# Patient Record
Sex: MALE | Race: WHITE | NOT HISPANIC OR LATINO | Employment: UNEMPLOYED | URBAN - METROPOLITAN AREA
[De-identification: names, ages, dates, MRNs, and addresses within clinical notes are randomized per-mention and may not be internally consistent; named-entity substitution may affect disease eponyms.]

---

## 2017-04-03 ENCOUNTER — HOSPITAL ENCOUNTER (OUTPATIENT)
Dept: ORTHOPEDIC SURGERY | Facility: CLINIC | Age: 32
Discharge: HOME | End: 2017-04-03
Attending: ORTHOPAEDIC SURGERY | Admitting: ORTHOPAEDIC SURGERY

## 2021-03-01 ENCOUNTER — TRANSCRIBE ORDERS (OUTPATIENT)
Dept: PHYSICAL THERAPY | Facility: CLINIC | Age: 36
End: 2021-03-01

## 2021-03-01 DIAGNOSIS — M54.10 RADICULAR LOW BACK PAIN: Primary | ICD-10-CM

## 2021-03-02 ENCOUNTER — TREATMENT (OUTPATIENT)
Dept: PHYSICAL THERAPY | Facility: CLINIC | Age: 36
End: 2021-03-02

## 2021-03-02 DIAGNOSIS — G89.29 CHRONIC BILATERAL LOW BACK PAIN, UNSPECIFIED WHETHER SCIATICA PRESENT: ICD-10-CM

## 2021-03-02 DIAGNOSIS — M54.50 CHRONIC BILATERAL LOW BACK PAIN, UNSPECIFIED WHETHER SCIATICA PRESENT: ICD-10-CM

## 2021-03-02 DIAGNOSIS — R29.3 ABNORMAL POSTURE: ICD-10-CM

## 2021-03-02 DIAGNOSIS — M54.10 RADICULAR LOW BACK PAIN: Primary | ICD-10-CM

## 2021-03-02 PROCEDURE — 97014 ELECTRIC STIMULATION THERAPY: CPT | Performed by: PHYSICAL THERAPIST

## 2021-03-02 PROCEDURE — 97162 PT EVAL MOD COMPLEX 30 MIN: CPT | Performed by: PHYSICAL THERAPIST

## 2021-03-02 PROCEDURE — 97140 MANUAL THERAPY 1/> REGIONS: CPT | Performed by: PHYSICAL THERAPIST

## 2021-03-02 NOTE — PROGRESS NOTES
Physical Therapy Initial Evaluation and Plan of Care    Patient: Pawel Burch   : 1985  Diagnosis/ICD-10 Code:  Radicular low back pain [M54.10]  Referring practitioner: Zackary Mcgrath DO  Date of Initial Visit: 3/2/2021  Today's Date: 3/2/2021  Patient seen for 1 sessions           Subjective Questionnaire: Oswestry: 39      Subjective Evaluation    History of Present Illness  Mechanism of injury: Patient reports that he started having back and leg pain in  but it has been off and on since. In 2020, he slipped off of a curb and vilma his right leg. Since then, he has had a numbing/burning pain in the back and into the leg; the bottom of the right foot is numb. Between  and 2020 he has injured his back and leg about 6 times.    Pain is located in the lumbar spine, buttock crease, above and below and knee, into the calf and down into the foot on the right.    Sleeping throughout the night, walking, standing, bending forward, going up and down stairs, and squatting tends to aggravate symptoms into the lower back and leg. Heat can help a little but nothing else really helps ease pain.    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 Patient took time off away from his job at this time due to pain.      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 back pain Quality of life: good    Pain  Current pain ratin  At best pain ratin  At worst pain rating: 10  Quality: burning, dull ache, radiating, tight, squeezing, pressure and cramping  Aggravating factors: ambulation, squatting, lifting, stairs, prolonged positioning, movement, standing and sleeping  Progression: worsening    Social Support  Lives in: apartment    Patient Goals  Patient goals for therapy: decreased pain, improved balance, increased motion, increased strength, independence with ADLs/IADLs and return to sport/leisure activities              Objective        Special Questions  Patient is experiencing night pain and disturbed sleep.       Postural Observations  Seated posture: fair        Palpation   Left   Hypertonic in the erector spinae and lumbar paraspinals.     Right   Hypertonic in the erector spinae, gluteus medius, lumbar paraspinals and piriformis. Tenderness of the erector spinae, gluteus medius, iliopsoas, lumbar paraspinals and piriformis.     Tenderness     Right Hip   Tenderness in the PSIS and ischial tuberosity.     Neurological Testing     Sensation     Lumbar   Left   Intact: light touch    Right   Intact: light touch    Active Range of Motion     Additional Active Range of Motion Details  Flexion 75% limited; extension WNL; L rotation WNL; R rotation limited 50%    Joint Play   Left Hip   Hypomobile in the posterior hip capsule and long axis distraction.    Right Hip     Hypomobile in the posterior hip capsule and long axis distraction    Strength/Myotome Testing     Lumbar   Left   Normal strength    Right Hip   Planes of Motion   Flexion: 3+    Right Knee   Flexion: 4+  Extension: 4+    Right Ankle/Foot   Dorsiflexion: 5    Tests       Thoracic   Positive slump.     Left Pelvic Girdle/Sacrum   Negative: sacrum compression and gapping.     Right Pelvic Girdle/Sacrum   Negative: sacrum compression and gapping.     Left Hip   Negative SOLOMON and FADIR.     Right Hip   Negative SOLOMON and FADIR.     Ambulation     Quality of Movement During Gait   Trunk    Trunk (Right): Positive lateral lean over stance limb.     Hip    Hip (Right): Positive circumducted.     Knee    Knee (Right): Positive stiff knee.           Assessment & Plan     Assessment  Impairments: abnormal coordination, abnormal muscle firing, abnormal muscle tone, abnormal or restricted ROM, activity intolerance, impaired physical strength, lacks appropriate home exercise program and pain with function  Assessment details: The patient is a 35 y.o. male who presents to  physical therapy today with orders to address low back pain. Upon initial evaluation, the patient demonstrates the following impairments: increased pain, increased muscle tone, abnormal posture, decreased strength, decreased joint mobility, and decreased ROM. Due to these impairments, the patient is unable to sleep throughout the night, sit for long periods of time, stand for long periods of time, and perform work related duties without an increase in symptoms. The patient would benefit from skilled PT services to address functional limitations and impairments and to improve patient quality of life.      Prognosis: good  Functional Limitations: carrying objects, lifting, sleeping, walking, pushing, uncomfortable because of pain, moving in bed, sitting and standing  Goals  Plan Goals: STG's: 2 weeks  Patient will report a decrease in pain by 25%   Patient will be able to pick a light (<3#) object off the floor without an increase in pain  Patient will be able to perform HEP with minimal verbal cues    LTG's: By discharge  Patient will report a decrease in pain by 90%  Patient will report an elimination of radicular symptoms  Patient will demonstrate an improvement in overall function as measured by an improvement in the Oswestry Disability Index score by >/= 10 points   Patient will be able to sleep > 5 hours without waking from pain  Patient will be able to tolerate standing for > 60 minutes without increased pain  Patient will be able to tolerate sitting for > 60 minutes without increased pain  Patient will be able to ambulate community distances without increased pain  Patient will be independent with final HEP       Plan  Therapy options: will be seen for skilled physical therapy services  Planned modality interventions: cryotherapy, electrical stimulation/Russian stimulation, TENS, thermotherapy (hydrocollator packs), dry needling and traction  Planned therapy interventions: abdominal trunk stabilization, ADL  retraining, balance/weight-bearing training, flexibility, functional ROM exercises, home exercise program, IADL retraining, joint mobilization, manual therapy, neuromuscular re-education, postural training, soft tissue mobilization, spinal/joint mobilization, strengthening, stretching and therapeutic activities  Duration in visits: 14  Treatment plan discussed with: patient        History # of Personal Factors and/or Comorbidities: MODERATE (1-2)  Examination of Body System(s): # of elements: MODERATE (3)  Clinical Presentation: EVOLVING  Clinical Decision Making: MODERATE      Timed:         Manual Therapy:    8     mins  44397;     Therapeutic Exercise:    5     mins  87299;     Neuromuscular Leeanna:        mins  30170;    Therapeutic Activity:          mins  19252;     Gait Training:           mins  24461;     Ultrasound:          mins  29052;    Ionto                                   mins   22974  Self Care                            mins   15965  Canalith Repos         mins 06481      Un-Timed:  Electrical Stimulation:    15     mins  76298 ( );  Dry Needling          mins self-pay  Traction          mins 42922  Low Eval          Mins  36665  Mod Eval     27     Mins  22282  High Eval                            Mins  84915  Re-Eval                               mins  83935        Timed Treatment:   13   mins   Total Treatment:     55   mins    PT SIGNATURE: Sammi Levine PT   DATE TREATMENT INITIATED: 3/2/2021    Initial Certification  Certification Period: 5/31/2021  I certify that the therapy services are furnished while this patient is under my care.  The services outlined above are required by this patient, and will be reviewed every 90 days.     PHYSICIAN: Zackary Mcgrath,       DATE:     Please sign and return via fax to [phone].. Thank you, [facility].

## 2021-03-10 ENCOUNTER — TREATMENT (OUTPATIENT)
Dept: PHYSICAL THERAPY | Facility: CLINIC | Age: 36
End: 2021-03-10

## 2021-03-10 DIAGNOSIS — G89.29 CHRONIC BILATERAL LOW BACK PAIN, UNSPECIFIED WHETHER SCIATICA PRESENT: ICD-10-CM

## 2021-03-10 DIAGNOSIS — R29.3 ABNORMAL POSTURE: ICD-10-CM

## 2021-03-10 DIAGNOSIS — M54.10 RADICULAR LOW BACK PAIN: Primary | ICD-10-CM

## 2021-03-10 DIAGNOSIS — M54.50 CHRONIC BILATERAL LOW BACK PAIN, UNSPECIFIED WHETHER SCIATICA PRESENT: ICD-10-CM

## 2021-03-10 PROCEDURE — 97140 MANUAL THERAPY 1/> REGIONS: CPT | Performed by: PHYSICAL THERAPIST

## 2021-03-10 PROCEDURE — 97110 THERAPEUTIC EXERCISES: CPT | Performed by: PHYSICAL THERAPIST

## 2021-03-10 PROCEDURE — 97112 NEUROMUSCULAR REEDUCATION: CPT | Performed by: PHYSICAL THERAPIST

## 2021-03-10 PROCEDURE — G0283 ELEC STIM OTHER THAN WOUND: HCPCS | Performed by: PHYSICAL THERAPIST

## 2021-03-10 NOTE — PROGRESS NOTES
Physical Therapy Daily Progress Note    VISIT#: 2    Subjective   Pawel Burch reports that he had a busy weekend, he had his son and they went bowling and to the park. He was hurting last Wednesday, his calf was cramping and was better after taking the potassium pill.  Pain Ratin    Objective     See Exercise, Manual, and Modality Logs for complete treatment.     Patient Education: mechanical traction, exercise rationale and cueing    Assessment & Plan     Assessment  Assessment details: Patient tolerated manual traction well today with a reduction in symptoms following. PT with possible mechanical traction at next visit due to patient's response to manual traction. Patient tolerated additional exercises well, was given verbal cueing for exercise modification to reduce discomfort with exercises.        Progress per Plan of Care and Progress strengthening /stabilization /functional activity          Timed:         Manual Therapy:    15     mins  30567;     Therapeutic Exercise:    13     mins  35096;     Neuromuscular Leeanna:  10      mins  46994;    Therapeutic Activity:          mins  80809;     Gait Training:           mins  80981;     Ultrasound:          mins  96278;    Ionto                                   mins   85657  Self Care                            mins   03288  Canalith Repos                   mins  56587    Un-Timed:  Electrical Stimulation:    15     mins  35992 ( );  Dry Needling          mins self-pay  Traction          mins 34432  Low Eval          Mins  10231  Mod Eval          Mins  30483  High Eval                            Mins  73275  Re-Eval                               mins  56887    Timed Treatment:   38   mins   Total Treatment:     53   mins    Sammi Levine PT  Physical Therapist  [place] License # [id number]

## 2021-03-23 ENCOUNTER — TREATMENT (OUTPATIENT)
Dept: PHYSICAL THERAPY | Facility: CLINIC | Age: 36
End: 2021-03-23

## 2021-03-23 DIAGNOSIS — M54.50 CHRONIC BILATERAL LOW BACK PAIN, UNSPECIFIED WHETHER SCIATICA PRESENT: ICD-10-CM

## 2021-03-23 DIAGNOSIS — G89.29 CHRONIC BILATERAL LOW BACK PAIN, UNSPECIFIED WHETHER SCIATICA PRESENT: ICD-10-CM

## 2021-03-23 DIAGNOSIS — M54.10 RADICULAR LOW BACK PAIN: Primary | ICD-10-CM

## 2021-03-23 DIAGNOSIS — R29.3 ABNORMAL POSTURE: ICD-10-CM

## 2021-03-23 PROCEDURE — G0283 ELEC STIM OTHER THAN WOUND: HCPCS | Performed by: PHYSICAL THERAPIST

## 2021-03-23 PROCEDURE — 97110 THERAPEUTIC EXERCISES: CPT | Performed by: PHYSICAL THERAPIST

## 2021-03-23 PROCEDURE — 97530 THERAPEUTIC ACTIVITIES: CPT | Performed by: PHYSICAL THERAPIST

## 2021-03-23 PROCEDURE — 97140 MANUAL THERAPY 1/> REGIONS: CPT | Performed by: PHYSICAL THERAPIST

## 2021-03-23 NOTE — PROGRESS NOTES
Physical Therapy Daily Progress Note    VISIT#: 3    Subjective   Pawel Burch reports that after the last therapy session after his back popped, he has had some pain in the back compared to numbness and more pain in the leg. He has had more gatito horses and waves of pain. But the waves of pain have gotten better, its just with certain movements.  Pain Ratin    Objective     See Exercise, Manual, and Modality Logs for complete treatment.     Patient Education: numbness/tingling vs pain, objective improvement    Assessment & Plan     Assessment  Assessment details: The patient presents to therapy today with improvement in sensation of the lumbar spine. Patient was experiencing numbness but now experiences pain, patient was educated on nerve pathways and pain sensation. Patient able to demonstrate an increase in ROM to the right with LTR exercises. PT to continue progressing per patient tolerance.        Progress per Plan of Care and Progress strengthening /stabilization /functional activity          Timed:         Manual Therapy:    20     mins  46374;     Therapeutic Exercise:    10     mins  89509;     Neuromuscular Leeanna:        mins  60476;    Therapeutic Activity:     10     mins  98998;     Gait Training:           mins  54556;     Ultrasound:          mins  40640;    Ionto                                   mins   66483  Self Care                            mins   82874  Canalith Repos                   mins  75303    Un-Timed:  Electrical Stimulation:    15     mins  52529 ( );  Dry Needling          mins self-pay  Traction          mins 62236  Low Eval          Mins  02206  Mod Eval          Mins  20962  High Eval                            Mins  80262  Re-Eval                               mins  38083    Timed Treatment:   40   mins   Total Treatment:     55   mins    Sammi Rubio, PT  Physical Therapist  [place] License # [id number]

## 2021-03-29 ENCOUNTER — TREATMENT (OUTPATIENT)
Dept: PHYSICAL THERAPY | Facility: CLINIC | Age: 36
End: 2021-03-29

## 2021-03-29 DIAGNOSIS — G89.29 CHRONIC BILATERAL LOW BACK PAIN, UNSPECIFIED WHETHER SCIATICA PRESENT: ICD-10-CM

## 2021-03-29 DIAGNOSIS — M54.10 RADICULAR LOW BACK PAIN: Primary | ICD-10-CM

## 2021-03-29 DIAGNOSIS — M54.50 CHRONIC BILATERAL LOW BACK PAIN, UNSPECIFIED WHETHER SCIATICA PRESENT: ICD-10-CM

## 2021-03-29 DIAGNOSIS — R29.3 ABNORMAL POSTURE: ICD-10-CM

## 2021-03-29 PROCEDURE — 97112 NEUROMUSCULAR REEDUCATION: CPT | Performed by: PHYSICAL THERAPIST

## 2021-03-29 PROCEDURE — 97110 THERAPEUTIC EXERCISES: CPT | Performed by: PHYSICAL THERAPIST

## 2021-03-29 PROCEDURE — G0283 ELEC STIM OTHER THAN WOUND: HCPCS | Performed by: PHYSICAL THERAPIST

## 2021-03-29 PROCEDURE — 97140 MANUAL THERAPY 1/> REGIONS: CPT | Performed by: PHYSICAL THERAPIST

## 2021-03-29 NOTE — PROGRESS NOTES
Physical Therapy Daily Progress Note    VISIT#: 4    Subjective   Pawel Burch reports that his back pain has gone down but the leg pain has increased. He feels like he is being stabbed when he takes a step. He almost went to the ER this morning because his leg pain was so high.  Pain Ratin    Objective     See Exercise, Manual, and Modality Logs for complete treatment.     Patient Education: importance of HEP and stretching, use of heat    Assessment & Plan     Assessment  Assessment details: Patient with tenderness over the right ischial tuberosity which responded well to sustained pressure. Patient demonstrated familiar leg pain and tingling with palpation and manual therapy. PT to continue progressing per patient tolerance.        Progress per Plan of Care and Progress strengthening /stabilization /functional activity          Timed:         Manual Therapy:    18     mins  14259;     Therapeutic Exercise:    10     mins  59020;     Neuromuscular Leeanna:    10    mins  61677;    Therapeutic Activity:          mins  04930;     Gait Training:           mins  74267;     Ultrasound:          mins  15784;    Ionto                                   mins   06656  Self Care                            mins   51481  Canalith Repos                   mins  85667    Un-Timed:  Electrical Stimulation:    15     mins  48726 ( );  Dry Needling          mins self-pay  Traction          mins 35975  Low Eval          Mins  34202  Mod Eval          Mins  03135  High Eval                            Mins  41527  Re-Eval                               mins  99032    Timed Treatment:   38   mins   Total Treatment:     53   mins    Sammi Rubio PT ([place])  Physical Therapist  [place] License # [id number]

## 2021-04-05 ENCOUNTER — TREATMENT (OUTPATIENT)
Dept: PHYSICAL THERAPY | Facility: CLINIC | Age: 36
End: 2021-04-05

## 2021-04-05 DIAGNOSIS — M54.50 CHRONIC BILATERAL LOW BACK PAIN, UNSPECIFIED WHETHER SCIATICA PRESENT: ICD-10-CM

## 2021-04-05 DIAGNOSIS — M54.10 RADICULAR LOW BACK PAIN: Primary | ICD-10-CM

## 2021-04-05 DIAGNOSIS — G89.29 CHRONIC BILATERAL LOW BACK PAIN, UNSPECIFIED WHETHER SCIATICA PRESENT: ICD-10-CM

## 2021-04-05 DIAGNOSIS — R29.3 ABNORMAL POSTURE: ICD-10-CM

## 2021-04-05 PROCEDURE — G0283 ELEC STIM OTHER THAN WOUND: HCPCS | Performed by: PHYSICAL THERAPIST

## 2021-04-05 PROCEDURE — 97140 MANUAL THERAPY 1/> REGIONS: CPT | Performed by: PHYSICAL THERAPIST

## 2021-04-05 NOTE — PROGRESS NOTES
Physical Therapy Daily Progress Note    VISIT#: 5    Subjective   Pawel Burch reports that he has severe pain in the leg today. The back pain continues to do well in the lower back but his leg pain has increased. Pain has been really high this weekend.   Pain Ratin    Objective     See Exercise, Manual, and Modality Logs for complete treatment.     Patient Education: exercise modification, contacting physician for further evaluation    Assessment & Plan     Assessment  Assessment details: The patient presents to therapy today with severe pain in the right buttock and was unable to complete therEx due to pain. Patient was advised to contact referring physician for further evaluation at this time along with PT for continued care.        Progress per Plan of Care and Progress strengthening /stabilization /functional activity          Timed:         Manual Therapy:    25     mins  75475;     Therapeutic Exercise:    6     mins  28933;     Neuromuscular Leeanna:        mins  22455;    Therapeutic Activity:          mins  57396;     Gait Training:           mins  97283;     Ultrasound:          mins  25508;    Ionto                                   mins   07409  Self Care                            mins   44524  Canalith Repos                   mins  11386    Un-Timed:  Electrical Stimulation:    15     mins  49005 ( );  Dry Needling          mins self-pay  Traction          mins 61325  Low Eval          Mins  95045  Mod Eval          Mins  88674  High Eval                            Mins  60431  Re-Eval                               mins  19168    Timed Treatment:   31   mins   Total Treatment:     50   mins    Sammi Rubio ([place]), PT  Physical Therapist  [place] License # [id number]

## 2021-04-14 ENCOUNTER — TREATMENT (OUTPATIENT)
Dept: PHYSICAL THERAPY | Facility: CLINIC | Age: 36
End: 2021-04-14

## 2021-04-14 DIAGNOSIS — G89.29 CHRONIC BILATERAL LOW BACK PAIN, UNSPECIFIED WHETHER SCIATICA PRESENT: ICD-10-CM

## 2021-04-14 DIAGNOSIS — R29.3 ABNORMAL POSTURE: ICD-10-CM

## 2021-04-14 DIAGNOSIS — M54.10 RADICULAR LOW BACK PAIN: Primary | ICD-10-CM

## 2021-04-14 DIAGNOSIS — M54.50 CHRONIC BILATERAL LOW BACK PAIN, UNSPECIFIED WHETHER SCIATICA PRESENT: ICD-10-CM

## 2021-04-14 PROCEDURE — G0283 ELEC STIM OTHER THAN WOUND: HCPCS | Performed by: PHYSICAL THERAPIST

## 2021-04-14 PROCEDURE — 97110 THERAPEUTIC EXERCISES: CPT | Performed by: PHYSICAL THERAPIST

## 2021-04-14 PROCEDURE — 97140 MANUAL THERAPY 1/> REGIONS: CPT | Performed by: PHYSICAL THERAPIST

## 2021-04-14 NOTE — PROGRESS NOTES
Physical Therapy Daily Progress Note    VISIT#: 6    Subjective   Pawel Burch reports that he is in a lot of pain today and that he say his doctor following his previous session and was instructed to finish his scheduled PT and would most likely need an MRI at that point if pain had not decreased.   Pain Rating (0/10): 9    Objective     See Exercise, Manual, and Modality Logs for complete treatment.     Assessment/Plan   Pt with high levels of pain today with pain noted throughout all movements and pt ambulating with decreased WT bearing on RLE and inability to perform SKTC on RLE due to pain. ESTIM utilized at the end of the session for pain management.     Plan  Progress per Plan of Care and Progress strengthening /stabilization /functional activity            Timed:         Manual Therapy:    15     mins  95992;     Therapeutic Exercise:    15     mins  89240;     Neuromuscular Leeanna:        mins  30883;    Therapeutic Activity:          mins  71356;     Gait Training:           mins  72656;     Ultrasound:          mins  99322;    Ionto                                   mins   94813  Self Care                            mins   86656    Un-Timed:  Electrical Stimulation:    15     mins  18236 ( );  Dry Needling          mins self-pay  Traction          mins 58715  Low Eval          Mins  19830  Mod Eval          Mins  52938  High Eval                            Mins  20368  Re-Eval                               mins  10813    Timed Treatment:   30   mins   Total Treatment:     45   mins    Martha Fenton PT, DPT  Physical Therapist

## 2021-04-21 ENCOUNTER — TREATMENT (OUTPATIENT)
Dept: PHYSICAL THERAPY | Facility: CLINIC | Age: 36
End: 2021-04-21

## 2021-04-21 DIAGNOSIS — M54.50 CHRONIC BILATERAL LOW BACK PAIN, UNSPECIFIED WHETHER SCIATICA PRESENT: ICD-10-CM

## 2021-04-21 DIAGNOSIS — R29.3 ABNORMAL POSTURE: ICD-10-CM

## 2021-04-21 DIAGNOSIS — M54.10 RADICULAR LOW BACK PAIN: Primary | ICD-10-CM

## 2021-04-21 DIAGNOSIS — G89.29 CHRONIC BILATERAL LOW BACK PAIN, UNSPECIFIED WHETHER SCIATICA PRESENT: ICD-10-CM

## 2021-04-21 PROCEDURE — G0283 ELEC STIM OTHER THAN WOUND: HCPCS | Performed by: PHYSICAL THERAPIST

## 2021-04-21 PROCEDURE — 97110 THERAPEUTIC EXERCISES: CPT | Performed by: PHYSICAL THERAPIST

## 2021-04-21 PROCEDURE — 97140 MANUAL THERAPY 1/> REGIONS: CPT | Performed by: PHYSICAL THERAPIST

## 2021-04-21 NOTE — PROGRESS NOTES
Physical Therapy Daily Progress Note    VISIT#: 7    Subjective   Pawel Burch reports that the pain in his buttock is still sharp in nature. No pain in the back but he has pain in the knee and tingling into the foot.  Pain Ratin    Objective     See Exercise, Manual, and Modality Logs for complete treatment.     Patient Education: POC    Assessment & Plan     Assessment  Assessment details: Patient demonstrated familiar tingling and pain with palpation of the right piriformis and ischial tuberosity. Some of patient's exercises were deferred due to increased pain at today's visit. PT to discharge at next visit with HEP.        Progress per Plan of Care and Progress strengthening /stabilization /functional activity          Timed:         Manual Therapy:    15     mins  48244;     Therapeutic Exercise:    15     mins  18518;     Neuromuscular Leeanna:        mins  40254;    Therapeutic Activity:          mins  29490;     Gait Training:           mins  89754;     Ultrasound:          mins  11329;    Ionto                                   mins   89672  Self Care                            mins   54300  Canalith Repos                   mins  98390    Un-Timed:  Electrical Stimulation:    15     mins  58194 ( );  Dry Needling          mins self-pay  Traction          mins 28104  Low Eval          Mins  98754  Mod Eval          Mins  99777  High Eval                            Mins  71426  Re-Eval                               mins  59320    Timed Treatment:   30   mins   Total Treatment:     45   mins    Sammi Rubio PT ([place])  Physical Therapist  [place] License # [id number]

## 2021-04-26 ENCOUNTER — TREATMENT (OUTPATIENT)
Dept: PHYSICAL THERAPY | Facility: CLINIC | Age: 36
End: 2021-04-26

## 2021-04-26 DIAGNOSIS — M54.50 CHRONIC BILATERAL LOW BACK PAIN, UNSPECIFIED WHETHER SCIATICA PRESENT: ICD-10-CM

## 2021-04-26 DIAGNOSIS — M54.10 RADICULAR LOW BACK PAIN: Primary | ICD-10-CM

## 2021-04-26 DIAGNOSIS — R29.3 ABNORMAL POSTURE: ICD-10-CM

## 2021-04-26 DIAGNOSIS — G89.29 CHRONIC BILATERAL LOW BACK PAIN, UNSPECIFIED WHETHER SCIATICA PRESENT: ICD-10-CM

## 2021-04-26 PROCEDURE — 97140 MANUAL THERAPY 1/> REGIONS: CPT | Performed by: PHYSICAL THERAPIST

## 2021-04-26 PROCEDURE — 97110 THERAPEUTIC EXERCISES: CPT | Performed by: PHYSICAL THERAPIST

## 2021-04-26 PROCEDURE — G0283 ELEC STIM OTHER THAN WOUND: HCPCS | Performed by: PHYSICAL THERAPIST

## 2021-04-26 NOTE — PROGRESS NOTES
Physical Therapy Daily Progress Note/Discharge Summary    VISIT#: 8    Subjective   Pawel Burch reports that his hip is feeling very swollen today. It is tight and painful. Today is his last day, he is getting an MRI from the doctor soon. Yesterday he tripped a little bit and stumbled a bit and his leg pain increased. He almost went to the ER last night.  Pain Ratin    Objective     See Exercise, Manual, and Modality Logs for complete treatment.     Patient Education: discharge summary, HEP    Assessment & Plan     Assessment  Assessment details: The patient is being discharged today due to a plateau in the patient progress. The patient has little to no lumbar spine pain, however, he continues to have worsening hip and lower extremity pain. The patient is being discharged with an HEP for continued self care. He is being discharged to his physician for further evaluation at this time.    Goals  Plan Goals: Plan Goals: STG's: 2 weeks  Patient will report a decrease in pain by 25%-NOT MET   Patient will be able to pick a light (<3#) object off the floor without an increase in pain-NOT MET  Patient will be able to perform HEP with minimal verbal cues-MET    LTG's: By discharge  Patient will report a decrease in pain by 90%-NOT MET  Patient will report an elimination of radicular symptoms-NOT MET  Patient will demonstrate an improvement in overall function as measured by an improvement in the Oswestry Disability Index score by >/= 10 points-NOT MET   Patient will be able to sleep > 5 hours without waking from pain-NOT MET  Patient will be able to tolerate standing for > 60 minutes without increased pain-NOT MET  Patient will be able to tolerate sitting for > 60 minutes without increased pain-NOT MET  Patient will be able to ambulate community distances without increased pain-NOT MET  Patient will be independent with final HEP-MET    Plan  Treatment plan discussed with: patient        Other: D/C with HEP and to MD  for further evaluation          Timed:         Manual Therapy:    15     mins  71636;     Therapeutic Exercise:    8     mins  98015;     Neuromuscular Leeanna:        mins  45739;    Therapeutic Activity:          mins  55436;     Gait Training:           mins  64059;     Ultrasound:          mins  52100;    Ionto                                   mins   68129  Self Care                            mins   11818  Canalith Repos                   mins  71662    Un-Timed:  Electrical Stimulation:    15     mins  37177 ( );  Dry Needling          mins self-pay  Traction          mins 44175  Low Eval          Mins  46391  Mod Eval          Mins  63937  High Eval                            Mins  59012  Re-Eval                               mins  48940    Timed Treatment:   23   mins   Total Treatment:     38   mins    Sammi Rubio PT  Physical Therapist  [place] License # [id number]

## 2021-06-28 ENCOUNTER — TREATMENT (OUTPATIENT)
Dept: PHYSICAL THERAPY | Facility: CLINIC | Age: 36
End: 2021-06-28

## 2021-06-28 DIAGNOSIS — R29.3 ABNORMAL POSTURE: ICD-10-CM

## 2021-06-28 DIAGNOSIS — M54.50 CHRONIC BILATERAL LOW BACK PAIN, UNSPECIFIED WHETHER SCIATICA PRESENT: ICD-10-CM

## 2021-06-28 DIAGNOSIS — G89.29 CHRONIC BILATERAL LOW BACK PAIN, UNSPECIFIED WHETHER SCIATICA PRESENT: ICD-10-CM

## 2021-06-28 DIAGNOSIS — M54.10 RADICULAR LOW BACK PAIN: Primary | ICD-10-CM

## 2021-06-28 PROCEDURE — 97530 THERAPEUTIC ACTIVITIES: CPT | Performed by: PHYSICAL THERAPIST

## 2021-06-28 PROCEDURE — 97162 PT EVAL MOD COMPLEX 30 MIN: CPT | Performed by: PHYSICAL THERAPIST

## 2021-06-28 NOTE — PROGRESS NOTES
Physical Therapy Initial Evaluation and Plan of Care    Patient: Pawel Burch   : 1985  Diagnosis/ICD-10 Code:  Radicular low back pain [M54.10]  Referring practitioner: Zackary Mcgrath DO  Date of Initial Visit: 2021  Today's Date: 2021  Patient seen for 1 sessions           Subjective Questionnaire: Oswestry: 31      Subjective Evaluation    History of Present Illness  Mechanism of injury: Patient reports that after stopping therapy, he had an increase in low back pain and no changes into the leg. He has an MRI scheduled for tomorrow of his lower back. He was sending him to PT just in case the MRI does not get approved. Patient reports that he vilma the back and he has had more burning into the lower back. The leg pain has not changed since the last therapy session. He tends to get cramping into the legs in the middle of the night.     Walking for long periods of time, standing for long periods of time, sitting for long periods of time, lowering himself into a chair, and sleeping throughout the night tend to aggravate symptoms into the lower back and hip. Nothing tends to reduce symptoms into the lower back and leg.     Quality of life: good    Pain  Current pain ratin  At best pain ratin  At worst pain ratin  Quality: burning and radiating  Aggravating factors: ambulation, squatting, lifting, stairs, prolonged positioning, movement, standing and sleeping  Progression: worsening    Treatments  Previous treatment: physical therapy  Patient Goals  Patient goals for therapy: decreased pain, improved balance, increased motion, increased strength, independence with ADLs/IADLs and return to sport/leisure activities             Objective        Special Questions  Patient is experiencing night pain and disturbed sleep.       Postural Observations  Seated posture: fair        Palpation   Left   Hypertonic in the erector spinae and lumbar paraspinals.     Right   Hypertonic in the erector  spinae, gluteus medius, lumbar paraspinals and piriformis. Tenderness of the erector spinae, gluteus medius, iliopsoas, lumbar paraspinals and piriformis.     Tenderness     Right Hip   Tenderness in the PSIS and ischial tuberosity.     Neurological Testing     Sensation     Lumbar   Left   Intact: light touch    Right   Intact: light touch    Active Range of Motion     Additional Active Range of Motion Details  Flexion 75% limited; extension WNL; L rotation WNL; R rotation limited 50%    Joint Play   Left Hip   Hypomobile in the posterior hip capsule and long axis distraction.    Right Hip     Hypomobile in the posterior hip capsule and long axis distraction    Strength/Myotome Testing     Lumbar   Left   Normal strength    Right Hip   Planes of Motion   Flexion: 3+    Right Knee   Flexion: 4+  Extension: 4+    Right Ankle/Foot   Dorsiflexion: 5    Tests       Thoracic   Positive slump.     Left Pelvic Girdle/Sacrum   Negative: sacrum compression and gapping.     Right Pelvic Girdle/Sacrum   Negative: sacrum compression and gapping.     Left Hip   Negative SOLOMON and FADIR.     Right Hip   Negative SOLOMON and FADIR.     Ambulation     Quality of Movement During Gait   Trunk    Trunk (Right): Positive lateral lean over stance limb.     Hip    Hip (Right): Positive circumducted.     Knee    Knee (Right): Positive stiff knee.           Assessment & Plan     Assessment  Impairments: abnormal coordination, abnormal muscle firing, abnormal muscle tone, abnormal or restricted ROM, activity intolerance, impaired physical strength, lacks appropriate home exercise program and pain with function  Assessment details: The patient is a 36 y.o. male who presents to physical therapy today with orders to address low back pain. Upon initial evaluation, the patient demonstrates the following impairments: increased pain, increased muscle tone, abnormal posture, decreased strength, decreased joint mobility, and decreased ROM. Due to  these impairments, the patient is unable to stand, sit, or walk for long periods of time, lower himself into a chair, and sleep throughout the night without an increase in symptoms. The patient would benefit from aquatic therapy at this time due to heating properties of the pool, gravity minimized environment, and buoyancy properties of the water. The patient would benefit from skilled PT services to address functional limitations and impairments and to improve patient quality of life.      Prognosis: good  Functional Limitations: carrying objects, lifting, sleeping, walking, uncomfortable because of pain, moving in bed, sitting, standing and stooping  Goals  Plan Goals: STG's: 2 weeks  Patient will report a decrease in pain by 25%   Patient will be able to pick a light (<3#) object off the floor without an increase in pain  Patient will be able to perform HEP with minimal verbal cues    LTG's: By discharge  Patient will report a decrease in pain by 90%  Patient will report an elimination of radicular symptoms  Patient will demonstrate an improvement in overall function as measured by an improvement in the Oswestry Disability Index score by >/= 10 points   Patient will be able to sleep > 5 hours without waking from pain  Patient will be able to tolerate standing for > 45 minutes without increased pain  Patient will be able to tolerate sitting for > 45 minutes without increased pain  Patient will be able to ambulate community distances without increased pain  Patient will be independent with final HEP       Plan  Therapy options: will be seen for skilled physical therapy services  Planned modality interventions: cryotherapy, electrical stimulation/Russian stimulation, TENS, thermotherapy (hydrocollator packs) and traction  Other planned modality interventions: aquatic therapy  Planned therapy interventions: abdominal trunk stabilization, ADL retraining, balance/weight-bearing training, flexibility, functional ROM  exercises, home exercise program, IADL retraining, joint mobilization, manual therapy, neuromuscular re-education, postural training, soft tissue mobilization, spinal/joint mobilization, strengthening, stretching and therapeutic activities  Frequency: 2x week  Duration in visits: 12  Duration in weeks: 6  Treatment plan discussed with: patient        History # of Personal Factors and/or Comorbidities: MODERATE (1-2)  Examination of Body System(s): # of elements: MODERATE (3)  Clinical Presentation: EVOLVING  Clinical Decision Making: MODERATE      Timed:         Manual Therapy:         mins  12333;     Therapeutic Exercise:         mins  25260;     Neuromuscular Leeanna:        mins  79292;    Therapeutic Activity:     10     mins  88703;     Gait Training:           mins  48243;     Ultrasound:          mins  91207;    Ionto                                   mins   93955  Self Care                            mins   66044  Canalith Repos         mins 63887      Un-Timed:  Electrical Stimulation:         mins  65401 ( );  Dry Needling          mins self-pay  Traction          mins 54642  Low Eval          Mins  98896  Mod Eval     15     Mins  69245  High Eval                            Mins  29811  Re-Eval                               mins  93437        Timed Treatment:   10   mins   Total Treatment:     25   mins    PT SIGNATURE: Sammi Rubio PT   DATE TREATMENT INITIATED: 6/28/2021    Initial Certification  Certification Period: 9/26/2021  I certify that the therapy services are furnished while this patient is under my care.  The services outlined above are required by this patient, and will be reviewed every 90 days.     PHYSICIAN: Zackary Mcgrath,       DATE:     Please sign and return via fax to [phone].. Thank you, [facility].

## 2021-08-23 ENCOUNTER — DOCUMENTATION (OUTPATIENT)
Dept: PHYSICAL THERAPY | Facility: CLINIC | Age: 36
End: 2021-08-23

## 2021-08-23 NOTE — PROGRESS NOTES
Discharge Summary  Discharge Summary from Physical Therapy Report      Dates  PT visit: 6/28/2021  Number of Visits: 1     Discharge Status of Patient: discharged    Goals: Not Met    Discharge Plan: Future need for rehabilitation activities; recommend aquatic therapy    Comments: The patient is being discharged today due to inactivity over 30 days per department policy. PT recommends patient be seen for aquatic therapy.     Date of Discharge 8/23/2021        Sammi Rubio, PT  Physical Therapist

## 2023-02-15 ENCOUNTER — LAB (OUTPATIENT)
Dept: LAB | Facility: HOSPITAL | Age: 38
End: 2023-02-15
Payer: MEDICAID

## 2023-02-15 ENCOUNTER — HOSPITAL ENCOUNTER (OUTPATIENT)
Dept: CARDIOLOGY | Facility: HOSPITAL | Age: 38
Discharge: HOME | End: 2023-02-15
Payer: MEDICAID

## 2023-02-15 ENCOUNTER — TRANSCRIBE ORDERS (OUTPATIENT)
Dept: ADMINISTRATIVE | Facility: HOSPITAL | Age: 38
End: 2023-02-15
Payer: MEDICAID

## 2023-02-15 DIAGNOSIS — Z01.818 PRE-OP TESTING: Primary | ICD-10-CM

## 2023-02-15 DIAGNOSIS — Z01.818 PRE-OP TESTING: ICD-10-CM

## 2023-02-15 LAB
ABO GROUP BLD: NORMAL
ANION GAP SERPL CALCULATED.3IONS-SCNC: 7.2 MMOL/L (ref 5–15)
BASOPHILS # BLD AUTO: 0.08 10*3/MM3 (ref 0–0.2)
BASOPHILS NFR BLD AUTO: 0.7 % (ref 0–1.5)
BLD GP AB SCN SERPL QL: NEGATIVE
BUN SERPL-MCNC: 14 MG/DL (ref 6–20)
BUN/CREAT SERPL: 14.6 (ref 7–25)
CALCIUM SPEC-SCNC: 10.4 MG/DL (ref 8.6–10.5)
CHLORIDE SERPL-SCNC: 102 MMOL/L (ref 98–107)
CO2 SERPL-SCNC: 27.8 MMOL/L (ref 22–29)
CREAT SERPL-MCNC: 0.96 MG/DL (ref 0.76–1.27)
DEPRECATED RDW RBC AUTO: 42 FL (ref 37–54)
EGFRCR SERPLBLD CKD-EPI 2021: 103.1 ML/MIN/1.73
EOSINOPHIL # BLD AUTO: 0.17 10*3/MM3 (ref 0–0.4)
EOSINOPHIL NFR BLD AUTO: 1.4 % (ref 0.3–6.2)
ERYTHROCYTE [DISTWIDTH] IN BLOOD BY AUTOMATED COUNT: 12.9 % (ref 12.3–15.4)
GLUCOSE SERPL-MCNC: 93 MG/DL (ref 65–99)
HCT VFR BLD AUTO: 52.8 % (ref 37.5–51)
HGB BLD-MCNC: 18 G/DL (ref 13–17.7)
IMM GRANULOCYTES # BLD AUTO: 0.03 10*3/MM3 (ref 0–0.05)
IMM GRANULOCYTES NFR BLD AUTO: 0.2 % (ref 0–0.5)
LYMPHOCYTES # BLD AUTO: 2.67 10*3/MM3 (ref 0.7–3.1)
LYMPHOCYTES NFR BLD AUTO: 21.8 % (ref 19.6–45.3)
MCH RBC QN AUTO: 30.6 PG (ref 26.6–33)
MCHC RBC AUTO-ENTMCNC: 34.1 G/DL (ref 31.5–35.7)
MCV RBC AUTO: 89.8 FL (ref 79–97)
MONOCYTES # BLD AUTO: 0.76 10*3/MM3 (ref 0.1–0.9)
MONOCYTES NFR BLD AUTO: 6.2 % (ref 5–12)
NEUTROPHILS NFR BLD AUTO: 69.7 % (ref 42.7–76)
NEUTROPHILS NFR BLD AUTO: 8.53 10*3/MM3 (ref 1.7–7)
NRBC BLD AUTO-RTO: 0 /100 WBC (ref 0–0.2)
PLATELET # BLD AUTO: 257 10*3/MM3 (ref 140–450)
PMV BLD AUTO: 11.4 FL (ref 6–12)
POTASSIUM SERPL-SCNC: 4.2 MMOL/L (ref 3.5–5.2)
RBC # BLD AUTO: 5.88 10*6/MM3 (ref 4.14–5.8)
RH BLD: POSITIVE
SODIUM SERPL-SCNC: 137 MMOL/L (ref 136–145)
T&S EXPIRATION DATE: NORMAL
WBC NRBC COR # BLD: 12.24 10*3/MM3 (ref 3.4–10.8)

## 2023-02-15 PROCEDURE — 93010 ELECTROCARDIOGRAM REPORT: CPT | Performed by: INTERNAL MEDICINE

## 2023-02-15 PROCEDURE — 80048 BASIC METABOLIC PNL TOTAL CA: CPT

## 2023-02-15 PROCEDURE — 86850 RBC ANTIBODY SCREEN: CPT

## 2023-02-15 PROCEDURE — 93005 ELECTROCARDIOGRAM TRACING: CPT | Performed by: ORTHOPAEDIC SURGERY

## 2023-02-15 PROCEDURE — 86900 BLOOD TYPING SEROLOGIC ABO: CPT

## 2023-02-15 PROCEDURE — 86901 BLOOD TYPING SEROLOGIC RH(D): CPT

## 2023-02-15 PROCEDURE — 36415 COLL VENOUS BLD VENIPUNCTURE: CPT

## 2023-02-15 PROCEDURE — 85025 COMPLETE CBC W/AUTO DIFF WBC: CPT

## 2023-02-27 LAB — QT INTERVAL: 341 MS
